# Patient Record
Sex: MALE | Race: WHITE | NOT HISPANIC OR LATINO | Employment: FULL TIME | URBAN - METROPOLITAN AREA
[De-identification: names, ages, dates, MRNs, and addresses within clinical notes are randomized per-mention and may not be internally consistent; named-entity substitution may affect disease eponyms.]

---

## 2023-12-01 ASSESSMENT — PROMIS GLOBAL HEALTH SCALE
RATE_AVERAGE_PAIN: 2
RATE_QUALITY_OF_LIFE: EXCELLENT
CARRYOUT_PHYSICAL_ACTIVITIES: COMPLETELY
RATE_PHYSICAL_HEALTH: VERY GOOD
RATE_AVERAGE_FATIGUE: MILD
EMOTIONAL_PROBLEMS: NEVER
RATE_MENTAL_HEALTH: EXCELLENT
CARRYOUT_SOCIAL_ACTIVITIES: EXCELLENT
RATE_SOCIAL_SATISFACTION: EXCELLENT
RATE_GENERAL_HEALTH: VERY GOOD

## 2023-12-04 ENCOUNTER — OFFICE VISIT (OUTPATIENT)
Dept: PRIMARY CARE | Facility: CLINIC | Age: 63
End: 2023-12-04
Payer: COMMERCIAL

## 2023-12-04 VITALS
BODY MASS INDEX: 27.9 KG/M2 | SYSTOLIC BLOOD PRESSURE: 124 MMHG | DIASTOLIC BLOOD PRESSURE: 70 MMHG | WEIGHT: 206 LBS | HEIGHT: 72 IN

## 2023-12-04 DIAGNOSIS — Z00.00 WELLNESS EXAMINATION: Primary | ICD-10-CM

## 2023-12-04 DIAGNOSIS — E55.9 VITAMIN D DEFICIENCY: ICD-10-CM

## 2023-12-04 DIAGNOSIS — E78.2 MIXED HYPERLIPIDEMIA: ICD-10-CM

## 2023-12-04 DIAGNOSIS — E53.8 B12 DEFICIENCY: ICD-10-CM

## 2023-12-04 DIAGNOSIS — Z12.5 SCREENING FOR PROSTATE CANCER: ICD-10-CM

## 2023-12-04 PROCEDURE — 1036F TOBACCO NON-USER: CPT | Performed by: FAMILY MEDICINE

## 2023-12-04 PROCEDURE — 99396 PREV VISIT EST AGE 40-64: CPT | Performed by: FAMILY MEDICINE

## 2023-12-04 RX ORDER — LANOLIN ALCOHOL/MO/W.PET/CERES
100 CREAM (GRAM) TOPICAL
COMMUNITY

## 2023-12-04 RX ORDER — GLUCOSAMINE/CHONDRO SU A 500-400 MG
1 TABLET ORAL DAILY
COMMUNITY

## 2023-12-04 ASSESSMENT — ENCOUNTER SYMPTOMS
CONFUSION: 0
EYES NEGATIVE: 1
CARDIOVASCULAR NEGATIVE: 1
CONSTITUTIONAL NEGATIVE: 1
GASTROINTESTINAL NEGATIVE: 1
ENDOCRINE NEGATIVE: 1
AGITATION: 0
RESPIRATORY NEGATIVE: 1
NEUROLOGICAL NEGATIVE: 1
NERVOUS/ANXIOUS: 0
MUSCULOSKELETAL NEGATIVE: 1

## 2023-12-04 ASSESSMENT — PATIENT HEALTH QUESTIONNAIRE - PHQ9
SUM OF ALL RESPONSES TO PHQ9 QUESTIONS 1 AND 2: 0
1. LITTLE INTEREST OR PLEASURE IN DOING THINGS: NOT AT ALL
2. FEELING DOWN, DEPRESSED OR HOPELESS: NOT AT ALL

## 2023-12-04 NOTE — PROGRESS NOTES
Subjective   Patient ID: Erasto Quiñones is a 63 y.o. male who presents for Annual Exam.  HPI  63 year old male is here today for his annual wellness exam.  He says he is doing well.  No other issues to discuss.    He does not take any prescription medications at this time.  He does take several vitamin supplements.    He is taking vit D 5000 international units daily and B 12 1000mcg 1-2x/week.      Colonoscopy  9/2022  Dr. Sanchez with   PSA   3/2021  HPI reviewed/agree  Pt generally feels fine  will be getting knees replaced once retired   no questions     Review of Systems   Constitutional: Negative.    HENT: Negative.     Eyes: Negative.    Respiratory: Negative.     Cardiovascular: Negative.    Gastrointestinal: Negative.    Endocrine: Negative.    Genitourinary: Negative.    Musculoskeletal: Negative.    Skin: Negative.    Neurological: Negative.    Psychiatric/Behavioral:  Negative for agitation and confusion. The patient is not nervous/anxious.        Objective   Physical Exam  Constitutional:       Appearance: Normal appearance.   HENT:      Head: Normocephalic and atraumatic.      Right Ear: Tympanic membrane and ear canal normal.      Left Ear: Tympanic membrane and ear canal normal.      Nose: Nose normal.      Mouth/Throat:      Pharynx: Oropharynx is clear.   Eyes:      Extraocular Movements: Extraocular movements intact.      Conjunctiva/sclera: Conjunctivae normal.      Pupils: Pupils are equal, round, and reactive to light.   Cardiovascular:      Rate and Rhythm: Normal rate and regular rhythm.      Pulses: Normal pulses.      Heart sounds: Normal heart sounds. No murmur heard.  Pulmonary:      Effort: Pulmonary effort is normal.      Breath sounds: Normal breath sounds.   Abdominal:      General: Abdomen is flat. Bowel sounds are normal.      Palpations: Abdomen is soft. There is no mass.      Tenderness: There is no abdominal tenderness.   Musculoskeletal:         General: Normal range of motion.    Skin:     General: Skin is warm and dry.   Neurological:      General: No focal deficit present.      Mental Status: He is alert and oriented to person, place, and time.   Psychiatric:         Mood and Affect: Mood normal.         Behavior: Behavior normal.         Thought Content: Thought content normal.         Judgment: Judgment normal.         Assessment/Plan   Diagnoses and all orders for this visit:  Wellness examination  Mixed hyperlipidemia  -     Lipid Panel; Future  -     Comprehensive Metabolic Panel; Future  -     Thyroid Stimulating Hormone; Future  B12 deficiency  -     Vitamin B12; Future  -     CBC; Future  Vitamin D deficiency  -     Vitamin D 25-Hydroxy,Total (for eval of Vitamin D levels); Future  Screening for prostate cancer  -     Prostate Specific Antigen, Screen; Future

## 2023-12-04 NOTE — PROGRESS NOTES
63 year old male is here today for his annual wellness exam.  He says he is doing well.  No other issues to discuss.    He does not take any prescription medications at this time.  He does take several vitamin supplements.    He is taking vit D 5000 international units daily and B 12 1000mcg 1-2x/week.      Colonoscopy  9/2022  Dr. Sanchez with   PSA   3/2021

## 2023-12-05 DIAGNOSIS — D72.819 LEUKOPENIA, UNSPECIFIED TYPE: Primary | ICD-10-CM

## 2024-01-18 ENCOUNTER — CLINICAL SUPPORT (OUTPATIENT)
Dept: AUDIOLOGY | Facility: CLINIC | Age: 64
End: 2024-01-18
Payer: COMMERCIAL

## 2024-01-18 DIAGNOSIS — H90.A22 SENSORINEURAL HEARING LOSS (SNHL) OF LEFT EAR WITH RESTRICTED HEARING OF RIGHT EAR: ICD-10-CM

## 2024-01-18 DIAGNOSIS — H90.A31 MIXED CONDUCTIVE AND SENSORINEURAL HEARING LOSS OF RIGHT EAR WITH RESTRICTED HEARING OF LEFT EAR: Primary | ICD-10-CM

## 2024-01-18 PROCEDURE — 92550 TYMPANOMETRY & REFLEX THRESH: CPT | Performed by: AUDIOLOGIST

## 2024-01-18 PROCEDURE — HRANC PR HEARING AID NO CHARGE: Performed by: AUDIOLOGIST

## 2024-01-18 PROCEDURE — 92557 COMPREHENSIVE HEARING TEST: CPT | Performed by: AUDIOLOGIST

## 2024-01-18 NOTE — PROGRESS NOTES
AUDIOLOGY  ADULT HEARING AID EVALUATION    Name:  Erasto Quiñones  :  1960  Age:  63 y.o.  Date of Service:  2024    Reason for visit: Mr. Quiñones is seen in the clinic today for a hearing aid evaluation.    HISTORY  Patient reports tinnitus and right greater than left hearing loss. Prior otolaryngology visits diagnosed right-sided otosclerosis. Audiogram completed today 2024 revealed normal hearing sensitivity through 3000 Hz with a moderate to moderately-severe sensorineural hearing loss in the higher frequencies in the right ear, and mild to severe mixed hearing loss in the left ear. Word recognition ability was good in the right ear and excellent in the left ear.     Patient has previously worn a monaural right Phonak Q70  in the canal (JOSE) hearing aid from our office. This device is now broken. There is no applicable benefit towards the cost of hearing aids per patient.    RESULTS  Otoscopic Evaluation  Right Ear: clear ear canal with visualization of the tympanic membrane  Left Ear: minimal non-occluding cerumen with visualization of the tympanic membrane    HEARING AID EVALUATION  Client Oriented Scale of Improvement (COSI) goals:  1. Hear easier    Communication difficulties, amplification options and potential benefits/limitations of hearing aids were discussed. Specifically discussed hearing aid styles, technology levels, and monaural versus binaural hearing aids. Also discussed the option to do nothing. Explained cell phone connectivity options, traditional battery versus rechargeable, and trial period.    Three year repair warranty and three year loss/damage warranty. No fee for hearing aid office visits within one year of hearing aid fitting; however, after one year there will be a fee.    IMPRESSIONS  Patient wishes to proceed with ordering new hearing aids.     Patient expressed interest in a trial of bilateral hearing aids. Specifically, ReSound Nexia 9 [id number]  miniRIE rechargeable  in the ear (YOLANDA) hearing aids in warm grey with 1LP receivers, medium closed dome on the right and medium open dome on the left, and no retention lines. Experienced Phonak traditional battery  in the canal (JOSE) hearing aid user. iPhone. Self-pay. [employer].    RECOMMENDATIONS  - Purchase Order Number was requested. Hearing aids were ordred from [employer].  - Hearing aid fitting appointment was scheduled.  - Annual audiologic evaluation, sooner if an acute change is noted.  - Follow-up with medical care team as planned.    PATIENT EDUCATION  Discussed results, impressions and recommendations with the patient. Questions were addressed and the patient was encouraged to contact our office should concerns arise.    Time for this encounter: 4728-4239    Martha Medrano, CCC-A  Licensed Audiologist

## 2024-01-18 NOTE — PROGRESS NOTES
AUDIOLOGY ADULT AUDIOMETRIC EVALUATION    Name:  Erasto Quiñones  :  1960  Age:  63 y.o.  Date of Evaluation:  2024    Reason for visit: Mr. Quiñones is seen in the clinic today for an audiologic evaluation.     HISTORY  Patient reports tinnitus and right greater than left hearing loss. Prior otolaryngology visits diagnosed right-sided otosclerosis. Has previously worn a monaural right Phonak Q70  in the canal (JOSE) hearing aid from our office.    EVALUATION  See scanned audiogram: “Media” > “Audiology Report” > Document ID [id number].    RESULTS  Otoscopic Evaluation  Right Ear: clear ear canal with visualization of the tympanic membrane  Left Ear: minimal non-occluding cerumen with visualization of the tympanic membrane    Immittance Measures  Tympanometry:  Right Ear: Type Ad, hypercompliant tympanic membrane mobility with normal middle ear pressure  Left Ear: Type A, normal tympanic membrane mobility with normal middle ear pressure    Acoustic Reflexes:  Ipsilateral Right Ear: absent from 500 Hz to 4000 Hz  Ipsilateral Left Ear: present and normal from 500 Hz to 4000 Hz  Contralateral Right Ear: did not evaluate  Contralateral Left Ear: did not evaluate    Distortion Product Otoacoustic Emissions (DPOAEs)  Right Ear: absent from 1000 Hz to 8000 Hz  Left Ear: present from 1000 Hz to 2000 Hz, absent from 3000 Hz to 8000 Hz    Audiometry  Test Technique and Reliability:   Standard audiometry via supra-aural headphones. Reliability is good.    Pure tone air and bone conduction audiometry:  Right Ear: normal hearing sensitivity through 3000 Hz with a moderate to moderately-severe sensorineural hearing loss in the higher frequencies   Left Ear: mild to severe mixed hearing loss     Speech Audiometry:  Speech Reception Threshold (SRT) Right Ear: 30 dB HL  Speech Reception Threshold (SRT) Left Ear: 10 dB HL  Word Recognition Score (WRS) Right Ear: Good, 88% at 70 dB HL with 40 dB HL  Word  Recognition Score (WRS) Left Ear: Excellent, 100% at 50 dB HL     IMPRESSIONS  Audiometric evaluation revealed mixed hearing loss in the right ear and high frequency sensorineural hearing loss in the left ear. Results show a slight decline compared with 08/11/2017 evaluation. The presence of acoustic reflexes within normal intensity limits is consistent with normal middle ear and brainstem function, and suggests that auditory sensitivity is not significantly impaired. An absent acoustic reflex threshold is consistent with a middle ear disorder, hearing loss in the stimulated ear, and/or interruption of neural innervation of the stapedius muscle. Present Distortion Product Otoacoustic Emissions (DPOAEs) suggest normal/near normal cochlear outer hair cell function and are consistent with no greater than a mild hearing loss at those frequencies. Absent DPOAEs are consistent with abnormal cochlear outer hair cell function and some degree of hearing loss at those frequencies.    RECOMMENDATIONS  - Follow up with otolaryngology.  - Hearing aid evaluation.  - Annual audiologic evaluation, sooner if an acute change is noted.  - Follow-up with medical care team as planned.    PATIENT EDUCATION  Discussed results, impressions and recommendations with the patient. Questions were addressed and the patient was encouraged to contact our office should concerns arise.    Time for this encounter: [id number]    Martha Medrano, CCC-A  Licensed Audiologist

## 2024-02-07 ENCOUNTER — CLINICAL SUPPORT (OUTPATIENT)
Dept: AUDIOLOGY | Facility: CLINIC | Age: 64
End: 2024-02-07

## 2024-02-07 DIAGNOSIS — H90.A22 SENSORINEURAL HEARING LOSS (SNHL) OF LEFT EAR WITH RESTRICTED HEARING OF RIGHT EAR: ICD-10-CM

## 2024-02-07 DIAGNOSIS — H90.A31 MIXED CONDUCTIVE AND SENSORINEURAL HEARING LOSS OF RIGHT EAR WITH RESTRICTED HEARING OF LEFT EAR: Primary | ICD-10-CM

## 2024-02-07 NOTE — PROGRESS NOTES
AUDIOLOGY ADULT HEARING AID FITTING    Name:  Erasto Quiñones  :  1960  Age:  63 y.o.  Date of Fitting:  2024    Reason for visit: Mr. Quiñones is seen in the clinic today for a hearing aid fitting appointment.    HISTORY  Patient reports tinnitus and right greater than left hearing loss. Prior otolaryngology visits diagnosed right-sided otosclerosis. Audiogram completed 2024 revealed normal hearing sensitivity through 3000 Hz with a moderate to moderately-severe sensorineural hearing loss in the higher frequencies in the right ear, and mild to severe mixed hearing loss in the left ear. Word recognition ability was good in the right ear and excellent in the left ear.      Patient has previously worn a monaural right Phonak Q70  in the canal (JOSE) hearing aid.    RESULTS  HEARING AID FITTING  Hearing aids were dispensed today 24: Binaural ReSound Nexia 9 [id number] miniRIE rechargeable  in the ear (YOLANDA) hearing aids in warm grey with 1LP receivers, medium open dome on the left, medium closed dome on the right, and no retention lines (right serial number [id number], left serial number [id number]). Hearing aid repair and loss/damage warranties end 2027.  repair warranty ends 2027 and  loss/damage warranty ends 2025. One year in-office service plan ends 2025. Self-pay. Connected to iPhone for jen and for streaming. Experienced monaural user, new binaural user. [employer].    Patient was fit with the hearing aids listed above using the hearing aid 's proprietary fitting formula during today's visit. Hearing aids are set as follows: 100% target gain, sound shaper is off, push button is set as a short push right raise left lower volume control and long push no function (extra long push is power off/on), tap control is off, and feedback manager was calibrated.    With measured real ear to  difference (RECD), probe microphone  real ear aided response (REAR) measurements verified a satisfactory fit using NAL-NL2 targets for soft, medium, and loud inputs (NAL = [employer]). Normal conversational speech was comfortable and easily understood. Extensive counseling was provided regarding the care and use of these devices. Counseling and instruction were provided at a level appropriate for patient's age and sophistication level.     Patient did well operating the hearing aids and expressed understanding of the care and use. Patient performed an adequate return demonstration of insertion, removal, charging, cleaning, operation of the hearing aids, and use of the jen. Hearing aids were paired to the patient's cell phone today per the patient's request. The following educational materials were provided: instructional brochures.    Patient expressed satisfaction with the sound quality and physical fit of the devices. Offered to schedule a follow-up appointment in 2-3 weeks to assess progress with the hearing aids; however, patient deferred. Instructed to contact me should there be any additional questions, or any problems arise. Recommend annual audiologic evaluation and annual appointment for routine hearing aid maintenance, sooner if needed.    IMPRESSIONS  New hearing aids were fit today. Prognosis with the hearing aids is good.    RECOMMENDATIONS  - Wear hearing aids.  - Contact the clinic if questions/problems arise.  - Annual audiologic evaluation, sooner if an acute change is noted.  - Follow-up with audiology annually for routine hearing aid maintenance, sooner if needed.  - Follow up with otolaryngology as planned.  - Follow-up with medical care team as planned.    PATIENT EDUCATION  Discussed results, impressions and recommendations with the patient. Questions were addressed and the patient was encouraged to contact our office should concerns arise.    CHARGE CAPTURE  $300 + $3080; paper encounter form utilized and  submitted to  for payment processing.    Time for this encounter: [phone]    Martha Medrano, CCC-A  Licensed Audiologist

## 2024-03-21 ENCOUNTER — CLINICAL SUPPORT (OUTPATIENT)
Dept: AUDIOLOGY | Facility: CLINIC | Age: 64
End: 2024-03-21
Payer: COMMERCIAL

## 2024-03-21 DIAGNOSIS — H90.A22 SENSORINEURAL HEARING LOSS (SNHL) OF LEFT EAR WITH RESTRICTED HEARING OF RIGHT EAR: ICD-10-CM

## 2024-03-21 DIAGNOSIS — H90.A31 MIXED CONDUCTIVE AND SENSORINEURAL HEARING LOSS OF RIGHT EAR WITH RESTRICTED HEARING OF LEFT EAR: Primary | ICD-10-CM

## 2024-03-21 PROCEDURE — HRANC PR HEARING AID NO CHARGE: Performed by: AUDIOLOGIST

## 2024-03-21 NOTE — PROGRESS NOTES
AUDIOLOGY ADULT HEARING AID CHECK    Name:  Erasto Quiñones  :  1960  Age:  63 y.o.  Date of Service:  2024    Reason for visit: Mr. Quiñones is seen in the clinic today for a hearing aid check appointment. Patient complains that his left hearing aid is coming out of his ears, otherwise they are helping him hear and are comfortable.    HISTORY  Hearing Aid History:  Patient wears binaural 2024 ReSound Nexia 9 [id number] miniRIE rechargeable  in the ear (YOLANDA) hearing aids in warm grey with 1LP receivers, medium open dome on the left, medium closed dome on the right, and no retention lines (right serial number [id number], left serial number [id number]). Hearing aid repair and loss/damage warranties end 2027.  repair warranty ends 2027 and  loss/damage warranty ends 2025. One year in-office service plan ends 2025. Self-pay. Connected to [employer] for jen and for streaming. [employer].    Patient has previously worn a monaural right Phonak Q70  in the canal (JOSE) hearing aid.    Hearing Loss History:  Patient reports tinnitus and right greater than left hearing loss. Prior otolaryngology visits diagnosed right-sided otosclerosis. Audiogram completed 2024 revealed normal hearing sensitivity through 3000 Hz with a moderate to moderately-severe sensorineural hearing loss in the higher frequencies in the right ear, and mild to severe mixed hearing loss in the left ear. Word recognition ability was good in the right ear and excellent in the left ear.      RESULTS  Hearing Aid Physical Examination:  Right: partially occluded wax guard  Left: partially occluded wax guard    Hearing Aid Clean & Check:  Hearing aids were sanitized and checked. Domes were replaced. Added retention lines.    IMPRESSIONS  Hearing aids were returned to the patient and he expressed satisfaction.    Patient wears binaural 2024 ReSound Nexia 9 [id number] miniRIE  rechargeable  in the ear (YOLANDA) hearing aids in warm grey with 1LP receivers, medium open dome on the left, medium closed dome on the right, and retention lines (right serial number [id number], left serial number [id number]). Hearing aid repair and loss/damage warranties end 02/19/2027.  repair warranty ends 02/19/2027 and  loss/damage warranty ends 02/19/2025. One year in-office service plan ends 02/07/2025. Self-pay. Connected to [employer] for jen and for streaming. [employer].    RECOMMENDATIONS  - Continued hearing aid use.  - Annual audiologic evaluation, sooner if an acute change is noted.  - Annual appointment for routine hearing aid maintenance, sooner if questions/problems arise.   - Follow up with otolaryngology as planned.  - Follow-up with medical care team as planned.    [employer] reminder message scheduled? Yes.    PATIENT EDUCATION  Discussed results, impressions and recommendations with the patient. Questions were addressed and the patient was encouraged to contact our office should concerns arise.    CHARGE CAPTURE  No fee under first year in-office service plan.     Time for this encounter: [phone]    Martha Medrano, CCC-A  Licensed Audiologist

## 2024-07-25 ENCOUNTER — CLINICAL SUPPORT (OUTPATIENT)
Dept: AUDIOLOGY | Facility: CLINIC | Age: 64
End: 2024-07-25
Payer: COMMERCIAL

## 2024-07-25 DIAGNOSIS — H90.A31 MIXED CONDUCTIVE AND SENSORINEURAL HEARING LOSS OF RIGHT EAR WITH RESTRICTED HEARING OF LEFT EAR: ICD-10-CM

## 2024-07-25 DIAGNOSIS — H90.A22 SENSORINEURAL HEARING LOSS (SNHL) OF LEFT EAR WITH RESTRICTED HEARING OF RIGHT EAR: Primary | ICD-10-CM

## 2024-07-25 PROCEDURE — HRANC PR HEARING AID NO CHARGE: Performed by: AUDIOLOGIST

## 2024-07-25 NOTE — PROGRESS NOTES
AUDIOLOGY  HEARING AID APPOINTMENT    Name:  Erasto Quiñones  :  1960  Age:  63 y.o.  Date of Service:  2024    Reason for visit: Mr. Quiñones is seen in the clinic today for a hearing aid check appointment. Patient complains that his right hearing aid is not making sound despite his cleaning it.    HISTORY  Hearing Aid History:  Patient wears binaural 2024 ReSound Nexia 9 [id number] miniRIE rechargeable  in the ear (YOLANDA) hearing aids in warm grey with 1LP receivers, medium open dome on the left, medium closed dome on the right, and no retention lines (right serial number [id number], left serial number [id number]). Hearing aid repair and loss/damage warranties end 2027.  repair warranty ends 2027 and  loss/damage warranty ends 2025. One year in-office service plan ends 2025. Connected to [employer] for jen and for streaming. [employer].     Patient has previously worn a monaural right Phonak Q70  in the canal (JOSE) hearing aid.     Hearing Loss History:  Patient reports tinnitus and right greater than left hearing loss. Prior otolaryngology visits diagnosed right-sided otosclerosis. Audiogram completed 2024 revealed normal hearing sensitivity through 3000 Hz with a moderate to moderately-severe sensorineural hearing loss in the higher frequencies in the right ear, and mild to severe mixed hearing loss in the left ear. Word recognition ability was good in the right ear and excellent in the left ear.      HEARING AIDS  Hearing Aid Initial Listening Check:  Right: weak   Left: slightly weak     Hearing Aid Physical Examination:  Right: unremarkable  Left: unremarkable    Hearing Aid Clean & Check:  Hearing aids were sanitized and checked. Receivers, domes, and retention lines were replaced. Microphones were brushed and vacuumed.    Hearing Aid Final Listening Check:  Right: improvement noted  Left: improvement noted    IMPRESSIONS  Hearing  aids were returned to the patient and he expressed satisfaction.    RECOMMENDATIONS  - Continued hearing aid use.  - Annual audiologic evaluation, sooner if an acute change is noted.  - Annual appointment for routine hearing aid maintenance, sooner if questions/problems arise.  - Follow-up with medical care team as planned.    [employer]hart reminder message scheduled? Yes.    PATIENT EDUCATION  Discussed results, impressions and recommendations with the patient. Questions were addressed and the patient was encouraged to contact our office should concerns arise.    CHARGE CAPTURE  No fee under first year in-office service plan.    Time for this encounter: [phone]    Martha Medrano, CCC-A  Licensed Audiologist

## 2024-08-15 ENCOUNTER — APPOINTMENT (OUTPATIENT)
Dept: PRIMARY CARE | Facility: CLINIC | Age: 64
End: 2024-08-15
Payer: COMMERCIAL

## 2024-09-04 ENCOUNTER — APPOINTMENT (OUTPATIENT)
Dept: PRIMARY CARE | Facility: CLINIC | Age: 64
End: 2024-09-04
Payer: COMMERCIAL

## 2024-09-09 ENCOUNTER — OFFICE VISIT (OUTPATIENT)
Dept: PRIMARY CARE | Facility: CLINIC | Age: 64
End: 2024-09-09
Payer: COMMERCIAL

## 2024-09-09 VITALS
HEART RATE: 55 BPM | HEIGHT: 72 IN | DIASTOLIC BLOOD PRESSURE: 82 MMHG | RESPIRATION RATE: 18 BRPM | SYSTOLIC BLOOD PRESSURE: 130 MMHG | WEIGHT: 197.8 LBS | BODY MASS INDEX: 26.79 KG/M2 | TEMPERATURE: 97.3 F | OXYGEN SATURATION: 97 %

## 2024-09-09 DIAGNOSIS — E78.2 MIXED HYPERLIPIDEMIA: ICD-10-CM

## 2024-09-09 DIAGNOSIS — Z76.89 ENCOUNTER TO ESTABLISH CARE WITH NEW DOCTOR: Primary | ICD-10-CM

## 2024-09-09 DIAGNOSIS — D72.819 LEUKOPENIA, UNSPECIFIED TYPE: ICD-10-CM

## 2024-09-09 PROCEDURE — 3008F BODY MASS INDEX DOCD: CPT | Performed by: FAMILY MEDICINE

## 2024-09-09 PROCEDURE — 99214 OFFICE O/P EST MOD 30 MIN: CPT | Performed by: FAMILY MEDICINE

## 2024-09-09 ASSESSMENT — PATIENT HEALTH QUESTIONNAIRE - PHQ9
SUM OF ALL RESPONSES TO PHQ9 QUESTIONS 1 & 2: 0
1. LITTLE INTEREST OR PLEASURE IN DOING THINGS: NOT AT ALL
2. FEELING DOWN, DEPRESSED OR HOPELESS: NOT AT ALL

## 2024-09-09 ASSESSMENT — COLUMBIA-SUICIDE SEVERITY RATING SCALE - C-SSRS
1. IN THE PAST MONTH, HAVE YOU WISHED YOU WERE DEAD OR WISHED YOU COULD GO TO SLEEP AND NOT WAKE UP?: NO
2. HAVE YOU ACTUALLY HAD ANY THOUGHTS OF KILLING YOURSELF?: NO
6. HAVE YOU EVER DONE ANYTHING, STARTED TO DO ANYTHING, OR PREPARED TO DO ANYTHING TO END YOUR LIFE?: NO

## 2024-09-09 ASSESSMENT — ENCOUNTER SYMPTOMS
LOSS OF SENSATION IN FEET: 0
DEPRESSION: 0
OCCASIONAL FEELINGS OF UNSTEADINESS: 0

## 2024-09-09 ASSESSMENT — PAIN SCALES - GENERAL: PAINLEVEL: 0-NO PAIN

## 2024-09-09 NOTE — PROGRESS NOTES
Outpatient Visit Note    Chief Complaint   Patient presents with    Annual Exam     Former Dr Henson patient       HPI:  Erasto Quiñones is a 63 y.o. male here to establish care and follow-up on his cholesterol.    Had a complete physical in December with his previous PCP, Dr. Henson.     At that time LDL cholesterol was elevated at 138 with total cholesterol 229.  Has a history of leukocytopenia.  Will recheck this as well.  Has not seen hematology before    Has been pursuing lifestyle modification with diet and exercise.  Has not been on a statin before. He is down 14 lbs.     Has no other concerns or questions today.    PHQ9/GAD7:         Patient Active Problem List    Diagnosis Date Noted    Mixed hyperlipidemia 09/09/2024    Leukopenia 09/09/2024    Mixed conductive and sensorineural hearing loss of right ear with restricted hearing of left ear 01/18/2024    Sensorineural hearing loss (SNHL) of left ear with restricted hearing of right ear 01/18/2024        Past Medical History:   Diagnosis Date    Arthritis 2000    HL (hearing loss) 2005        Current Medications  Current Outpatient Medications   Medication Instructions    cyanocobalamin (VITAMIN B-12) 100 mcg, oral, Once Weekly    glucosamine-chondroitin 500-400 mg tablet 1 tablet, oral, Daily    mv-min/folic/K1/lycopen/lutein (CENTRUM SILVER MEN ORAL) oral, Daily    NON FORMULARY Vitamin D3  5000 international units one capsule daily      NON FORMULARY Natures Bounty Probiotic   one tablet daily    TURMERIC ORAL oral, Daily        Allergies  No Known Allergies     Past Surgical History:   Procedure Laterality Date    CIRCUMCISION, PRIMARY  1960    FRACTURE SURGERY  1981 and 1992    WISDOM TOOTH EXTRACTION  1984     Family History   Problem Relation Name Age of Onset    Heart disease Mother Татьяна Quiñones     Vision loss Mother Татьяна Quiñones     Heart disease Father Corby Greenry     Diabetes Sister Ama Greenry     Diabetes Brother Thang Quiñones      Heart disease Brother Lester Quiñones      Social History     Tobacco Use    Smoking status: Never     Passive exposure: Past    Smokeless tobacco: Never   Vaping Use    Vaping status: Never Used   Substance Use Topics    Alcohol use: Yes     Alcohol/week: 9.0 standard drinks of alcohol     Types: 7 Glasses of wine, 2 Cans of beer per week     Comment: socially    Drug use: Never     Tobacco Use: Low Risk  (9/9/2024)    Patient History     Smoking Tobacco Use: Never     Smokeless Tobacco Use: Never     Passive Exposure: Past        ROS  All pertinent positive symptoms are included in the history of present illness.  All other systems have been reviewed and are negative and noncontributory to this patient's current ailments.    VITAL SIGNS  Vitals:    09/09/24 0931   BP: 130/82   Pulse: 55   Resp: 18   Temp: 36.3 °C (97.3 °F)   SpO2: 97%     Vitals:    09/09/24 0931   Weight: 89.7 kg (197 lb 12.8 oz)      Body mass index is 26.83 kg/m².     PHYSICAL EXAM  GENERAL APPEARANCE: well nourished, well developed, looks like stated age, in no acute distress, not ill or tired appearing, conversing well.   HEENT: no trauma, normocephalic.   NECK: supple without rigidity, no neck mass was observed.   LUNGS: good chest wall expansion. In no acute respiratory distress.   EXTREMITIES: moving all extremities equally with no edema.   SKIN: normal skin color and pigmentation, without rash.   NEUROLOGIC EXAM: CN II-XII grossly intact, normal gait.   PSYCH: mood and affect appropriate; alert and oriented to time, place, person; no difficulty with speech or language.     Counseling:       Medication education:           Education:  The patient is counseled regarding potential side-effects of all new medications          Understanding:  Patient expressed understanding of information conveyed today          Adherence:  No barriers to adherence identified     Assessment/Plan   Problem List Items Addressed This Visit             ICD-10-CM     Mixed hyperlipidemia E78.2    Relevant Orders    Lipid Panel    Comprehensive Metabolic Panel    Leukopenia D72.819    Relevant Orders    Lipid Panel    CBC    Comprehensive Metabolic Panel     Other Visit Diagnoses         Codes    Encounter to establish care with new doctor    -  Primary Z76.89            Additional Visit Plans:  Plan to check blood work for monitoring your cholesterol levels in light of lifestyle modification efforts and to see if your white count is back to normal.  Please have this done when you are fasting.    Next Wellness Exam/Annual Physical Due  Due for a complete physical after December 4th.     Patient Care Team:  Ras Morton DO as PCP - General (Family Medicine)  Fawn Henson MD as PCP - [place] [facility] PCP    Ras Morton DO   09/09/24   10:01 AM

## 2024-09-09 NOTE — PATIENT INSTRUCTIONS
Problem List Items Addressed This Visit             ICD-10-CM    Mixed hyperlipidemia E78.2    Relevant Orders    Lipid Panel    Comprehensive Metabolic Panel    Leukopenia D72.819    Relevant Orders    Lipid Panel    CBC    Comprehensive Metabolic Panel     Other Visit Diagnoses         Codes    Encounter to establish care with new doctor    -  Primary Z76.89            Additional Visit Plans:  Plan to check blood work for monitoring your cholesterol levels in light of lifestyle modification efforts and to see if your white count is back to normal.  Please have this done when you are fasting.    Next Wellness Exam/Annual Physical Due  Due for a complete physical after December 4th.     Patient Care Team:  Ras Morton DO as PCP - General (Family Medicine)  Fawn Henson MD as PCP - [facility] PCP    Ras Morton DO   09/09/24   10:01 AM

## 2024-09-12 ENCOUNTER — LAB (OUTPATIENT)
Dept: LAB | Facility: LAB | Age: 64
End: 2024-09-12
Payer: COMMERCIAL

## 2024-09-12 DIAGNOSIS — E78.2 MIXED HYPERLIPIDEMIA: ICD-10-CM

## 2024-09-12 DIAGNOSIS — D72.819 LEUKOPENIA, UNSPECIFIED TYPE: ICD-10-CM

## 2024-09-12 LAB
ALBUMIN SERPL BCP-MCNC: 4.4 G/DL (ref 3.4–5)
ALP SERPL-CCNC: 74 U/L (ref 33–136)
ALT SERPL W P-5'-P-CCNC: 19 U/L (ref 10–52)
ANION GAP SERPL CALC-SCNC: 11 MMOL/L (ref 10–20)
AST SERPL W P-5'-P-CCNC: 21 U/L (ref 9–39)
BILIRUB SERPL-MCNC: 0.7 MG/DL (ref 0–1.2)
BUN SERPL-MCNC: 16 MG/DL (ref 6–23)
CALCIUM SERPL-MCNC: 9.2 MG/DL (ref 8.6–10.3)
CHLORIDE SERPL-SCNC: 104 MMOL/L (ref 98–107)
CHOLEST SERPL-MCNC: 196 MG/DL (ref 0–199)
CHOLESTEROL/HDL RATIO: 2.5
CO2 SERPL-SCNC: 29 MMOL/L (ref 21–32)
CREAT SERPL-MCNC: 0.95 MG/DL (ref 0.5–1.3)
EGFRCR SERPLBLD CKD-EPI 2021: 90 ML/MIN/1.73M*2
ERYTHROCYTE [DISTWIDTH] IN BLOOD BY AUTOMATED COUNT: 12.7 % (ref 11.5–14.5)
GLUCOSE SERPL-MCNC: 85 MG/DL (ref 74–99)
HCT VFR BLD AUTO: 48.7 % (ref 41–52)
HDLC SERPL-MCNC: 78.1 MG/DL
HGB BLD-MCNC: 16.1 G/DL (ref 13.5–17.5)
LDLC SERPL CALC-MCNC: 101 MG/DL
MCH RBC QN AUTO: 30.6 PG (ref 26–34)
MCHC RBC AUTO-ENTMCNC: 33.1 G/DL (ref 32–36)
MCV RBC AUTO: 93 FL (ref 80–100)
NON HDL CHOLESTEROL: 118 MG/DL (ref 0–149)
NRBC BLD-RTO: 0 /100 WBCS (ref 0–0)
PLATELET # BLD AUTO: 213 X10*3/UL (ref 150–450)
POTASSIUM SERPL-SCNC: 4.6 MMOL/L (ref 3.5–5.3)
PROT SERPL-MCNC: 6.8 G/DL (ref 6.4–8.2)
RBC # BLD AUTO: 5.26 X10*6/UL (ref 4.5–5.9)
SODIUM SERPL-SCNC: 139 MMOL/L (ref 136–145)
TRIGL SERPL-MCNC: 85 MG/DL (ref 0–149)
VLDL: 17 MG/DL (ref 0–40)
WBC # BLD AUTO: 4.4 X10*3/UL (ref 4.4–11.3)

## 2024-09-12 PROCEDURE — 36415 COLL VENOUS BLD VENIPUNCTURE: CPT

## 2024-09-12 PROCEDURE — 85027 COMPLETE CBC AUTOMATED: CPT

## 2024-09-12 PROCEDURE — 80053 COMPREHEN METABOLIC PANEL: CPT

## 2024-09-12 PROCEDURE — 80061 LIPID PANEL: CPT

## 2024-12-06 ENCOUNTER — OFFICE VISIT (OUTPATIENT)
Dept: PRIMARY CARE | Facility: CLINIC | Age: 64
End: 2024-12-06
Payer: COMMERCIAL

## 2024-12-06 VITALS
HEART RATE: 60 BPM | SYSTOLIC BLOOD PRESSURE: 138 MMHG | WEIGHT: 195.1 LBS | RESPIRATION RATE: 16 BRPM | DIASTOLIC BLOOD PRESSURE: 84 MMHG | TEMPERATURE: 97.9 F | BODY MASS INDEX: 26.46 KG/M2 | OXYGEN SATURATION: 98 %

## 2024-12-06 DIAGNOSIS — Z01.84 IMMUNITY STATUS TESTING: ICD-10-CM

## 2024-12-06 DIAGNOSIS — Z12.5 PROSTATE CANCER SCREENING: ICD-10-CM

## 2024-12-06 DIAGNOSIS — Z23 ENCOUNTER FOR IMMUNIZATION: ICD-10-CM

## 2024-12-06 DIAGNOSIS — Z00.00 ROUTINE HEALTH MAINTENANCE: Primary | ICD-10-CM

## 2024-12-06 PROCEDURE — 90471 IMMUNIZATION ADMIN: CPT | Performed by: FAMILY MEDICINE

## 2024-12-06 PROCEDURE — 99396 PREV VISIT EST AGE 40-64: CPT | Performed by: FAMILY MEDICINE

## 2024-12-06 PROCEDURE — 90715 TDAP VACCINE 7 YRS/> IM: CPT | Performed by: FAMILY MEDICINE

## 2024-12-06 ASSESSMENT — ENCOUNTER SYMPTOMS
DEPRESSION: 0
LOSS OF SENSATION IN FEET: 0
OCCASIONAL FEELINGS OF UNSTEADINESS: 0

## 2024-12-06 ASSESSMENT — PROMIS GLOBAL HEALTH SCALE
RATE_SOCIAL_SATISFACTION: EXCELLENT
RATE_MENTAL_HEALTH: EXCELLENT
RATE_GENERAL_HEALTH: EXCELLENT
RATE_AVERAGE_PAIN: 2
RATE_AVERAGE_FATIGUE: MILD
CARRYOUT_PHYSICAL_ACTIVITIES: MOSTLY
EMOTIONAL_PROBLEMS: NEVER
CARRYOUT_SOCIAL_ACTIVITIES: EXCELLENT
RATE_PHYSICAL_HEALTH: VERY GOOD
RATE_QUALITY_OF_LIFE: EXCELLENT

## 2024-12-06 ASSESSMENT — COLUMBIA-SUICIDE SEVERITY RATING SCALE - C-SSRS
2. HAVE YOU ACTUALLY HAD ANY THOUGHTS OF KILLING YOURSELF?: NO
6. HAVE YOU EVER DONE ANYTHING, STARTED TO DO ANYTHING, OR PREPARED TO DO ANYTHING TO END YOUR LIFE?: NO
1. IN THE PAST MONTH, HAVE YOU WISHED YOU WERE DEAD OR WISHED YOU COULD GO TO SLEEP AND NOT WAKE UP?: NO

## 2024-12-06 ASSESSMENT — PATIENT HEALTH QUESTIONNAIRE - PHQ9
SUM OF ALL RESPONSES TO PHQ9 QUESTIONS 1 & 2: 0
2. FEELING DOWN, DEPRESSED OR HOPELESS: NOT AT ALL
1. LITTLE INTEREST OR PLEASURE IN DOING THINGS: NOT AT ALL

## 2024-12-06 NOTE — PATIENT INSTRUCTIONS
Problem List Items Addressed This Visit    None  Visit Diagnoses         Codes    Routine health maintenance    -  Primary Z00.00    Relevant Orders    Prostate Specific Antigen, Screen    Tsh With Reflex To Free T4 If Abnormal    Varicella zoster antibody, IgG    Encounter for immunization     Z23    Relevant Orders    Tdap vaccine, age 7 years and older (ADACEL)    Immunity status testing     Z01.84    Relevant Orders    Varicella zoster antibody, IgG    Prostate cancer screening     Z12.5    Relevant Orders    Prostate Specific Antigen, Screen            Additional Visit Plans:  - Complete history and physical examination was performed      GENERAL RECOMMENDATIONS:  - I encourage you to eat a low-fat, moderate-carbohydrate, low-calorie diet to maintain a normal BMI (under 25) to reduce heart disease, and risk for diabetes  - Moderate intensity exercise for 30 minutes 5 days per week is recommended  - Helpful resources recommended by the American Academy of Family Practice can be found at www.familydoctor.org or www.choosemyplate.gov  - Can also consider enrolling in a program such as Weight Watchers or Teresa [facility]. The most effective diet is going to be one you can follow long term and turn into a lifestyle. The CDC recommends losing 0.5-2 lbs a week if overweight or obese.  - I recommend a routine vision check and dental cleanings every 6 months      BLOOD TESTING:  - We will obtain routine blood work. The office will notify you of the test results once they are available and make any treatment recommendations accordingly  - Blood work may include a cholesterol and diabetes screen if risk factors exist (overweight, high blood pressure etc); screening for sexually transmitted infections; and Hepatitis C Virus screening      VACCINATION RECOMMENDATIONS:  - Flu shot annually. Up to date  - Tetanus booster every 10 years. Received today, next due 2034  - Two pneumonia vaccinations starting at 65 years old (or  earlier if risk factors - smoker, diabetic, heart or lung conditions) - not due yet.  - Shingles vaccine for those 50 years or older - you will think about this. Did not have chickenpox as a kid.       SCREENING RECOMMENDATIONS:  - Colon cancer screening (with colonoscopy or Cologuard) for men and women starting at age 45 until 74 years old (or earlier if family history of colon cancer) - reportedly up to date    Look into the [facility] in [place] for your varicosities.     Next Wellness Exam/Annual Physical Due  In 1 year from today    Patient Care Team:  Ras Morton DO as PCP - General (Family Medicine)  Fawn Henson MD as PCP - [facility] PCP    Ras Morton DO   12/06/24   11:37 AM

## 2024-12-06 NOTE — PROGRESS NOTES
Outpatient Visit Note    Chief Complaint   Patient presents with    Annual Exam       HPI:  Erasto Quiñones is a 64 y.o. male here  for a complete physical.    Health Maintenance.  Immunizations: Tdap is due at this time.  Influenza was done at work.  Shingrix is due, did not have chickepox.    Denies smoking or illicit drug use, drinks 7 alcoholic beverages a week. Patient reports routine vision checks and dental cleanings, and regular exercise with cycling and walking. Patient is not fasting for routine blood work today.    Screening colonoscopy done with Dr Sanchez, last done 2 years ago with 5 year clearance.     Otherwise denies fevers, chills, SOB, CP, N/V, abdominal pain, dysuria, hematuria, melena, diarrhea or LE edema. Having some mild cold symptoms that are improving.     PHQ9/GAD7:         Patient Active Problem List    Diagnosis Date Noted    Mixed hyperlipidemia 09/09/2024    Leukopenia 09/09/2024    Mixed conductive and sensorineural hearing loss of right ear with restricted hearing of left ear 01/18/2024    Sensorineural hearing loss (SNHL) of left ear with restricted hearing of right ear 01/18/2024        Past Medical History:   Diagnosis Date    Arthritis 2000    HL (hearing loss) 2005        Current Medications  Current Outpatient Medications   Medication Instructions    cyanocobalamin (VITAMIN B-12) 100 mcg, Once Weekly    glucosamine-chondroitin 500-400 mg tablet 1 tablet, Daily    mv-min/folic/K1/lycopen/lutein (CENTRUM SILVER MEN ORAL) Daily    NON FORMULARY Vitamin D3  5000 international units one capsule daily    NON FORMULARY Natures Bounty Probiotic   one tablet daily    TURMERIC ORAL Daily        Allergies  No Known Allergies     Immunizations  Immunization History   Administered Date(s) Administered    Flu vaccine (IIV4), preservative free *Check age/dose* 09/24/2017, 09/17/2020    Influenza, Unspecified 10/01/2020    Influenza, seasonal, injectable 09/19/2023    Pfizer COVID-19  vaccine, 12 years and older, (30mcg/0.3mL) (Comirnaty) 11/18/2023, 09/15/2024    Pfizer Gray Cap SARS-CoV-2 05/22/2022    Pfizer Purple Cap SARS-CoV-2 03/12/2021, 04/02/2021, 11/26/2021    Tdap vaccine, age 7 year and older (BOOSTRIX, ADACEL) 10/10/2012, 02/04/2013        Past Surgical History:   Procedure Laterality Date    CIRCUMCISION, PRIMARY  1960    FRACTURE SURGERY  1981 and 1992    WISDOM TOOTH EXTRACTION  1984     Family History   Problem Relation Name Age of Onset    Heart disease Mother Татьяна Quiñones     Vision loss Mother Татьяна Quiñones     Heart disease Father Corby Quiñones     Diabetes Sister Ama Quiñones     Diabetes Brother Thang Quiñones     Heart disease Brother Lester Quiñones      Social History     Tobacco Use    Smoking status: Never     Passive exposure: Past    Smokeless tobacco: Never   Vaping Use    Vaping status: Never Used   Substance Use Topics    Alcohol use: Yes     Alcohol/week: 9.0 standard drinks of alcohol     Types: 7 Glasses of wine, 2 Cans of beer per week     Comment: socially    Drug use: Never     Tobacco Use: Low Risk  (12/6/2024)    Patient History     Smoking Tobacco Use: Never     Smokeless Tobacco Use: Never     Passive Exposure: Past        ROS  All pertinent positive symptoms are included in the history of present illness.  All other systems have been reviewed and are negative and noncontributory to this patient's current ailments.    VITAL SIGNS  Vitals:    12/06/24 1040   BP: 138/84   Pulse: 60   Resp: 16   Temp: 36.6 °C (97.9 °F)   SpO2: 98%     Vitals:    12/06/24 1040   Weight: 88.5 kg (195 lb 1.6 oz)      Body mass index is 26.46 kg/m².     PHYSICAL EXAM  GENERAL APPEARANCE: well nourished, well developed, looks like stated age, in no acute distress, not ill or tired appearing, conversing well.   HEENT: no trauma, normocephalic. PERRLA and EOMI with normal external exam. TM's intact with no injection or effusion, no signs of infection.  NECK: no nodes, supple without  rigidity, no neck mass was observed, no thyromegaly or thyroid nodules.   HEART: regular rate and rhythm, S1 and S2 heard with no murmurs or skipped beats, no carotid bruits.   LUNGS: clear to auscultation bilaterally with no wheezes, crackles or rales.   ABDOMEN: no organomegaly, soft, nontender, nondistended, normal bowel sounds, no guarding/rebound/rigidity.   EXTREMITIES: moving all extremities equally with no edema or deformities.  Several spider veins this around the ankles  SKIN: normal skin color and pigmentation, normal skin turgor without rash, lesions, or nodules visualized.   NEUROLOGIC EXAM: CN II-XII grossly intact, normal gait, normal balance, 5/5 muscle strength,  PSYCH: mood and affect appropriate; alert and oriented to time, place, person; no difficulty with speech or language.   LYMPH NODES: no cervical lymphadenopathy.           Counseling:       Medication education:           Education:  The patient is counseled regarding potential side-effects of all new medications          Understanding:  Patient expressed understanding of information conveyed today          Adherence:  No barriers to adherence identified      Assessment/Plan   Problem List Items Addressed This Visit    None  Visit Diagnoses         Codes    Routine health maintenance    -  Primary Z00.00    Relevant Orders    Prostate Specific Antigen, Screen    Tsh With Reflex To Free T4 If Abnormal    Varicella zoster antibody, IgG    Encounter for immunization     Z23    Relevant Orders    Tdap vaccine, age 7 years and older (ADACEL)    Immunity status testing     Z01.84    Relevant Orders    Varicella zoster antibody, IgG    Prostate cancer screening     Z12.5    Relevant Orders    Prostate Specific Antigen, Screen            Additional Visit Plans:  - Complete history and physical examination was performed      GENERAL RECOMMENDATIONS:  - I encourage you to eat a low-fat, moderate-carbohydrate, low-calorie diet to maintain a normal BMI  (under 25) to reduce heart disease, and risk for diabetes  - Moderate intensity exercise for 30 minutes 5 days per week is recommended  - Helpful resources recommended by the American Academy of Family Practice can be found at www.familydoctor.org or www.choosemyplate.gov  - Can also consider enrolling in a program such as Weight Watchers or Teresa Mcc[facility]. The most effective diet is going to be one you can follow long term and turn into a lifestyle. The CDC recommends losing 0.5-2 lbs a week if overweight or obese.  - I recommend a routine vision check and dental cleanings every 6 months      BLOOD TESTING:  - We will obtain routine blood work. The office will notify you of the test results once they are available and make any treatment recommendations accordingly  - Blood work may include a cholesterol and diabetes screen if risk factors exist (overweight, high blood pressure etc); screening for sexually transmitted infections; and Hepatitis C Virus screening      VACCINATION RECOMMENDATIONS:  - Flu shot annually. Up to date  - Tetanus booster every 10 years. Received today, next due 2034  - Two pneumonia vaccinations starting at 65 years old (or earlier if risk factors - smoker, diabetic, heart or lung conditions) - not due yet.  - Shingles vaccine for those 50 years or older - you will think about this. Did not have chickenpox as a kid.       SCREENING RECOMMENDATIONS:  - Colon cancer screening (with colonoscopy or Cologuard) for men and women starting at age 45 until 74 years old (or earlier if family history of colon cancer) - reportedly up to date    Look into the [facility] in [place] for your varicosities.     Next Wellness Exam/Annual Physical Due  In 1 year from today    Patient Care Team:  Ras Morton DO as PCP - General (Family Medicine)  Fawn Henson MD as PCP - [facility] PCP    Ras Morton DO   12/06/24   11:39 AM

## 2024-12-09 ENCOUNTER — LAB (OUTPATIENT)
Dept: LAB | Facility: LAB | Age: 64
End: 2024-12-09
Payer: COMMERCIAL

## 2024-12-09 DIAGNOSIS — Z12.5 PROSTATE CANCER SCREENING: ICD-10-CM

## 2024-12-09 DIAGNOSIS — Z01.84 IMMUNITY STATUS TESTING: ICD-10-CM

## 2024-12-09 DIAGNOSIS — Z00.00 ROUTINE HEALTH MAINTENANCE: ICD-10-CM

## 2024-12-09 LAB
PSA SERPL-MCNC: 0.38 NG/ML
TSH SERPL-ACNC: 2.9 MIU/L (ref 0.44–3.98)
VARICELLA ZOSTER IGG INDEX: 3.5 IA
VZV IGG SER QL IA: POSITIVE

## 2024-12-09 PROCEDURE — 84153 ASSAY OF PSA TOTAL: CPT

## 2024-12-09 PROCEDURE — 86787 VARICELLA-ZOSTER ANTIBODY: CPT

## 2024-12-09 PROCEDURE — 36415 COLL VENOUS BLD VENIPUNCTURE: CPT

## 2024-12-09 PROCEDURE — 84443 ASSAY THYROID STIM HORMONE: CPT

## 2025-01-09 ENCOUNTER — APPOINTMENT (OUTPATIENT)
Dept: AUDIOLOGY | Facility: CLINIC | Age: 65
End: 2025-01-09

## 2025-01-09 DIAGNOSIS — H90.A22 SENSORINEURAL HEARING LOSS (SNHL) OF LEFT EAR WITH RESTRICTED HEARING OF RIGHT EAR: ICD-10-CM

## 2025-01-09 DIAGNOSIS — H90.A31 MIXED CONDUCTIVE AND SENSORINEURAL HEARING LOSS OF RIGHT EAR WITH RESTRICTED HEARING OF LEFT EAR: ICD-10-CM

## 2025-01-09 PROCEDURE — HRANC PR HEARING AID NO CHARGE: Performed by: AUDIOLOGIST

## 2025-01-09 NOTE — PROGRESS NOTES
AUDIOLOGY  HEARING AID APPOINTMENT    Name:  Erasto Quiñones  :  1960  Age:  64 y.o.  Date of Service:  2025    Reason for visit: Mr. Quiñones is seen in the clinic today for a hearing aid check appointment. Patient complains that his right hearing aid is not working and not connecting to his phone.    HISTORY  Hearing Aid History:  Patient wears binaural 2024 ReSound Nexia 9 [id number] miniRIE rechargeable  in the ear (YOLANDA) hearing aids in warm grey with 1LP receivers, medium open dome on the left, medium closed dome on the right, and no retention lines (right serial number [id number], left serial number [id number]). Hearing aid repair and loss/damage warranties end 2027.  repair warranty ends 2027 and  loss/damage warranty ends 2025. One year in-office service plan ends 2025. Connected to [employer] for jen and for streaming. [employer].     Patient has previously worn a monaural right Phonak Q70  in the canal (JOSE) hearing aid.     Hearing Loss History:  Patient reports tinnitus and right greater than left hearing loss. Prior otolaryngology visits diagnosed right-sided otosclerosis. Audiogram completed 2024 revealed normal hearing sensitivity through 3000 Hz with a moderate to moderately-severe sensorineural hearing loss in the higher frequencies in the right ear, and mild to severe mixed hearing loss in the left ear. Word recognition ability was good in the right ear and excellent in the left ear.      HEARING AIDS  Hearing Aid Initial Listening Check:  Right: weak  Left: weak     Hearing Aid Physical Examination:  Right: unremarkable  Left: unremarkable    Hearing Aid Clean & Check:  Hearing aids were sanitized and checked. Receivers, domes, and retention lines were replaced. Microphones were brushed and vacuumed. Turned off and on via push button.    Hearing Aid Final Listening Check:  Right: improvement noted  Left: improvement  noted    Hearing Aid Connectivity:  Confirmed proper connectivity to the patient's cell phone.     IMPRESSIONS  Hearing aids were returned to the patient and he expressed satisfaction. If problem persists, we will send device(s) for an in-warranty  repair (patient will drop them off in the office).    RECOMMENDATIONS  - Continued hearing aid use.  - Annual appointment for routine hearing aid maintenance, sooner if questions/problems arise.  - Annual audiologic evaluation, sooner if an acute change is noted.  - Follow up with otolaryngology as planned.  - Follow-up with medical care team as planned.    PATIENT EDUCATION  Discussed results, impressions and recommendations with the patient. Questions were addressed and the patient was encouraged to contact our office should concerns arise.    CHARGE CAPTURE  No fee under in-office service plan.    Time for this encounter: [phone]    Martha Medrano, CCC-A  Licensed Audiologist